# Patient Record
Sex: FEMALE | Race: OTHER | HISPANIC OR LATINO | Employment: FULL TIME | URBAN - METROPOLITAN AREA
[De-identification: names, ages, dates, MRNs, and addresses within clinical notes are randomized per-mention and may not be internally consistent; named-entity substitution may affect disease eponyms.]

---

## 2019-09-18 ENCOUNTER — OFFICE VISIT (OUTPATIENT)
Dept: FAMILY MEDICINE CLINIC | Facility: CLINIC | Age: 29
End: 2019-09-18

## 2019-09-18 VITALS
DIASTOLIC BLOOD PRESSURE: 80 MMHG | WEIGHT: 193 LBS | HEART RATE: 87 BPM | HEIGHT: 65 IN | BODY MASS INDEX: 32.15 KG/M2 | SYSTOLIC BLOOD PRESSURE: 120 MMHG | TEMPERATURE: 98 F | OXYGEN SATURATION: 93 %

## 2019-09-18 DIAGNOSIS — E66.9 OBESITY (BMI 30.0-34.9): ICD-10-CM

## 2019-09-18 DIAGNOSIS — Z00.01 ENCOUNTER FOR WELL ADULT EXAM WITH ABNORMAL FINDINGS: ICD-10-CM

## 2019-09-18 DIAGNOSIS — J02.9 SORE THROAT: ICD-10-CM

## 2019-09-18 DIAGNOSIS — Z23 NEEDS FLU SHOT: ICD-10-CM

## 2019-09-18 DIAGNOSIS — J06.9 VIRAL UPPER RESPIRATORY TRACT INFECTION: Primary | ICD-10-CM

## 2019-09-18 DIAGNOSIS — N92.6 MISSED MENSES: ICD-10-CM

## 2019-09-18 DIAGNOSIS — R73.9 HYPERGLYCEMIA: ICD-10-CM

## 2019-09-18 DIAGNOSIS — Z13.220 SCREENING FOR CHOLESTEROL LEVEL: ICD-10-CM

## 2019-09-18 LAB — S PYO AG THROAT QL: NEGATIVE

## 2019-09-18 PROCEDURE — 87880 STREP A ASSAY W/OPTIC: CPT | Performed by: NURSE PRACTITIONER

## 2019-09-18 PROCEDURE — 99213 OFFICE O/P EST LOW 20 MIN: CPT | Performed by: NURSE PRACTITIONER

## 2019-09-18 RX ORDER — ETONOGESTREL AND ETHINYL ESTRADIOL 11.7; 2.7 MG/1; MG/1
INSERT, EXTENDED RELEASE VAGINAL
COMMUNITY
Start: 2019-05-09

## 2019-09-18 RX ORDER — TOPIRAMATE 25 MG/1
TABLET ORAL
COMMUNITY
Start: 2018-09-07

## 2019-09-18 RX ORDER — PHENTERMINE HYDROCHLORIDE 37.5 MG/1
37.5 CAPSULE ORAL
COMMUNITY

## 2019-09-18 NOTE — LETTER
September 18, 2019     Patient: Rickey Ni   YOB: 1990   Date of Visit: 9/18/2019       To Whom it May Concern:    Rickey Ni is under my professional care  She was seen in my office on 9/18/2019  She may return to work on 09/20/19  If you have any questions or concerns, please don't hesitate to call           Sincerely,          DONATO Marie

## 2019-09-18 NOTE — PROGRESS NOTES
Assessment/Plan:    Viral upper respiratory tract infection  I recommended warm moist air, hydration, warm facial packs, turning on hot shower and inhaling steam to help ease with breathing  Avoid extremely cool and dry air  Nasal saline may provide temporary relief of congestion by removing nasal crusts and dried secretions  Drinking 8 or more 8-oz glasses of water, juice, or non-caffeinated beverages daily may help to thin mucous secretions and replace fluid losses  Sipping hot water or warm drinks may be more soothing to the nasal passages than ice cold drinks  Should follow up if their symptoms do not improve, worsen within 72 hours, or recur  Missed menses  -Ordering HCG quantitative  Will follow up with patient  Sore throat  -POCT strep negative  I am recommending supportive care  Increasing in fluid intake  Other supportive measures are warm salt water gargles, hard candy, frozen desserts, and humidifier  BMI Counseling: Body mass index is 32 12 kg/m²  The BMI is above normal  Nutrition recommendations include reducing portion sizes, 3-5 servings of fruits/vegetables daily, decreasing soda and/or juice intake and increasing intake of lean protein  Exercise recommendations include moderate aerobic physical activity for 150 minutes/week  Diagnoses and all orders for this visit:    Viral upper respiratory tract infection    Sore throat  -     POCT rapid strepA    Missed menses  -     hCG, quantitative; Future    Obesity (BMI 30 0-34 9)  -     TSH, 3rd generation with Free T4 reflex; Future    Screening for cholesterol level  -     Lipid Panel with Direct LDL reflex; Future    Hyperglycemia  -     Comprehensive metabolic panel;  Future    Encounter for well adult exam with abnormal findings  -     CBC and differential; Future    Needs flu shot  -     influenza vaccine, [id number], quadrivalent, 0 5 mL, preservative-free, for adult and pediatric patients 6 mos+ (AFLURIA, [place] [address], FLULAVAL, FLUZONE)    Other orders  -     Cholecalciferol 09400 units capsule  -     etonogestrel-ethinyl estradiol (NUVARING) 0 12-0 015 MG/24HR vaginal ring  -     phentermine 37 5 MG capsule; Take 37 5 mg by mouth  -     topiramate (TOPAMAX) 25 mg tablet          Subjective:      Patient ID: Michelle Delgado is a 34 y o  female  34year old female patient here to re-establish care  PMHx: no significant past medical history  Patient feeling sick and under the weather  She also verbalized that she got her period on 8/19/19 and it only last 2 days and it was a brownish discharge  Currently sexually active with no birth control  Sore Throat    This is a new problem  The current episode started in the past 7 days (3 days)  The problem has been gradually worsening  Neither side of throat is experiencing more pain than the other  The maximum temperature recorded prior to her arrival was 100 4 - 100 9 F  The fever has been present for 1 to 2 days  The pain is at a severity of 5/10  The pain is moderate  Associated symptoms include congestion, coughing, headaches, swollen glands and trouble swallowing  Pertinent negatives include no ear discharge, ear pain, plugged ear sensation or neck pain  She has had no exposure to strep or mono  She has tried acetaminophen for the symptoms  The treatment provided moderate relief  The following portions of the patient's history were reviewed and updated as appropriate: allergies, current medications, past family history, past medical history, past social history, past surgical history and problem list     Review of Systems   Constitutional: Negative  Negative for appetite change, fatigue and fever  HENT: Positive for congestion, sore throat and trouble swallowing  Negative for ear discharge, ear pain, postnasal drip, sinus pressure and sinus pain  Eyes: Negative  Respiratory: Positive for cough  Negative for chest tightness and wheezing  Cardiovascular: Negative    Negative for chest pain and palpitations  Gastrointestinal: Negative  Negative for abdominal distention  Endocrine: Negative  Genitourinary: Positive for menstrual problem  Musculoskeletal: Negative  Negative for arthralgias and neck pain  Skin: Negative  Allergic/Immunologic: Negative  Neurological: Positive for headaches  Negative for dizziness  Hematological: Negative  Negative for adenopathy  Does not bruise/bleed easily  Psychiatric/Behavioral: Negative  Objective:      /80 (BP Location: Left arm, Patient Position: Sitting, Cuff Size: Adult)   Pulse 87   Temp 98 °F (36 7 °C) (Temporal)   Ht 5' 5" (1 651 m)   Wt 87 5 kg (193 lb)   LMP 08/20/2019 (Exact Date)   SpO2 93%   BMI 32 12 kg/m²          Physical Exam   Constitutional: She is oriented to person, place, and time  She appears well-developed and well-nourished  HENT:   Head: Normocephalic and atraumatic  Right Ear: Hearing, tympanic membrane, external ear and ear canal normal    Left Ear: Hearing, tympanic membrane, external ear and ear canal normal    Nose: Rhinorrhea present  Mouth/Throat: Uvula is midline and mucous membranes are normal  Posterior oropharyngeal erythema present  Tonsils are 1+ on the right  Tonsils are 1+ on the left  Eyes: Pupils are equal, round, and reactive to light  EOM are normal    Neck: Normal range of motion  Neck supple  Cardiovascular: Normal rate, regular rhythm, normal heart sounds and intact distal pulses  Pulmonary/Chest: Effort normal and breath sounds normal  No respiratory distress  She has no wheezes  She has no rales  Abdominal: Soft  Bowel sounds are normal    Musculoskeletal: Normal range of motion  Lymphadenopathy:     She has no cervical adenopathy  Neurological: She is alert and oriented to person, place, and time  She has normal reflexes  Skin: Skin is warm and dry  Capillary refill takes less than 2 seconds     Psychiatric: She has a normal mood and affect   Her behavior is normal

## 2019-09-18 NOTE — ASSESSMENT & PLAN NOTE
-POCT strep negative  I am recommending supportive care  Increasing in fluid intake  Other supportive measures are warm salt water gargles, hard candy, frozen desserts, and humidifier

## 2019-12-17 ENCOUNTER — TELEPHONE (OUTPATIENT)
Dept: FAMILY MEDICINE CLINIC | Facility: CLINIC | Age: 29
End: 2019-12-17

## 2019-12-17 DIAGNOSIS — Z11.1 SCREENING FOR TUBERCULOSIS: Primary | ICD-10-CM

## 2019-12-17 NOTE — TELEPHONE ENCOUNTER
Pt called stated she needs a quant gold for work would like order for [facility] has no insurance

## 2021-01-06 ENCOUNTER — TELEPHONE (OUTPATIENT)
Dept: FAMILY MEDICINE CLINIC | Facility: CLINIC | Age: 31
End: 2021-01-06

## 2023-04-19 PROBLEM — J02.9 SORE THROAT: Status: RESOLVED | Noted: 2019-09-18 | Resolved: 2023-04-19

## 2023-04-19 PROBLEM — R53.83 OTHER FATIGUE: Status: ACTIVE | Noted: 2019-06-28

## 2023-04-19 PROBLEM — R55 SYNCOPE: Status: ACTIVE | Noted: 2023-04-19

## 2023-04-19 PROBLEM — F43.23 ADJUSTMENT DISORDER WITH MIXED ANXIETY AND DEPRESSED MOOD: Status: ACTIVE | Noted: 2023-04-19

## 2023-04-19 PROBLEM — R63.5 WEIGHT GAIN: Status: ACTIVE | Noted: 2023-04-19

## 2023-04-19 PROBLEM — J06.9 VIRAL UPPER RESPIRATORY TRACT INFECTION: Status: RESOLVED | Noted: 2019-09-18 | Resolved: 2023-04-19

## 2023-04-19 PROBLEM — G43.C0 PERIODIC HEADACHE SYNDROME, NOT INTRACTABLE: Status: ACTIVE | Noted: 2023-04-19

## 2023-05-05 ENCOUNTER — PATIENT OUTREACH (OUTPATIENT)
Dept: FAMILY MEDICINE CLINIC | Facility: CLINIC | Age: 33
End: 2023-05-05

## 2023-05-05 NOTE — PROGRESS NOTES
FRANCA KELLY received referral from provider, James Andujar PA-C regarding patient having adjustment disorder, financial issues, difficulty with her boss at work and not having health insurance  FRANCA KELLY did place call to the patient, Hilda Ramachandran and left message  FRANCA KELLY will await return call to provide resources and psychosocial support as needed

## 2023-05-10 ENCOUNTER — PATIENT OUTREACH (OUTPATIENT)
Dept: FAMILY MEDICINE CLINIC | Facility: CLINIC | Age: 33
End: 2023-05-10

## 2023-05-10 DIAGNOSIS — Z59.9 FINANCIAL DIFFICULTIES: Primary | ICD-10-CM

## 2023-05-10 SDOH — ECONOMIC STABILITY - INCOME SECURITY: PROBLEM RELATED TO HOUSING AND ECONOMIC CIRCUMSTANCES, UNSPECIFIED: Z59.9
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
## 2023-05-11 ENCOUNTER — PATIENT OUTREACH (OUTPATIENT)
Dept: FAMILY MEDICINE CLINIC | Facility: CLINIC | Age: 33
End: 2023-05-11

## 2023-05-11 NOTE — PROGRESS NOTES
Patient was referred on Findhelp to [facility], [employer], [address] [place] [address] for health     FRANCA KELLY emailed patient information on biweekly food distrubution through [facility] and [employer]    FRANCA KELLY will continue to remain available for psychosocial support as needed  Propranolol Counseling:  I discussed with the patient the risks of propranolol including but not limited to low heart rate, low blood pressure, low blood sugar, restlessness and increased cold sensitivity. They should call the office if they experience any of these side effects.

## 2023-05-15 ENCOUNTER — PATIENT OUTREACH (OUTPATIENT)
Dept: FAMILY MEDICINE CLINIC | Facility: CLINIC | Age: 33
End: 2023-05-15

## 2023-05-22 ENCOUNTER — PATIENT OUTREACH (OUTPATIENT)
Dept: FAMILY MEDICINE CLINIC | Facility: CLINIC | Age: 33
End: 2023-05-22

## 2023-05-22 NOTE — PROGRESS NOTES
FRANCA KELLY placed follow up call to the patient, Agnes and left message  FRANCA KELLY will continue to remain available for psychosocial support as needed

## 2023-05-30 ENCOUNTER — PATIENT OUTREACH (OUTPATIENT)
Dept: FAMILY MEDICINE CLINIC | Facility: CLINIC | Age: 33
End: 2023-05-30

## 2023-06-05 ENCOUNTER — PATIENT OUTREACH (OUTPATIENT)
Dept: FAMILY MEDICINE CLINIC | Facility: CLINIC | Age: 33
End: 2023-06-05

## 2023-06-05 NOTE — LETTER
06/05/23    Dear George Cole,    I am a Community Health Worker with [facility] AT [address] PRIMARY CARE [address] [id number] [phone]  I have made several attempts to call you by phone  It is important that you contact me back at [phone] so that I can assist with your care needs       Sincerely,         Tuan Cotter  [facility]

## 2023-06-13 ENCOUNTER — PATIENT OUTREACH (OUTPATIENT)
Dept: FAMILY MEDICINE CLINIC | Facility: CLINIC | Age: 33
End: 2023-06-13

## 2023-06-23 ENCOUNTER — PATIENT OUTREACH (OUTPATIENT)
Dept: FAMILY MEDICINE CLINIC | Facility: CLINIC | Age: 33
End: 2023-06-23

## 2023-06-23 NOTE — PROGRESS NOTES
FRANCA KELLY placed additional follow up call to the patient, Rashmi Evans and was unable to leave a message as voice box was full  FRANCA KELLY will continue to remain available for psychosocial support as needed

## 2023-07-13 ENCOUNTER — PATIENT OUTREACH (OUTPATIENT)
Dept: FAMILY MEDICINE CLINIC | Facility: CLINIC | Age: 33
End: 2023-07-13

## 2023-07-13 NOTE — PROGRESS NOTES
has made several attempts to outreach pt and have been unable to reach pt. OC did mail out an MA application so that pt can complete and submit to the [facility] assistance office. OC will be closing case due to unable to reach. No further outreach.

## 2023-07-14 ENCOUNTER — PATIENT OUTREACH (OUTPATIENT)
Dept: FAMILY MEDICINE CLINIC | Facility: CLINIC | Age: 33
End: 2023-07-14

## 2023-07-14 NOTE — PROGRESS NOTES
FRANCA KELLY received in-basket from [facility] indicating that [facility] referral was closed due to lack of response from patient. [facility] had sent patient SNAP application via mail. Patient never returned any calls to [facility]. FRANCA KELLY had sent food bank resources, food delivery resources, mental health locations, local housing support services, and information on [employer]. FRANCA CM will close referral due to lost communication. FRANCA CM will remain available for future psychosocial support as needed.

## 2023-09-11 DIAGNOSIS — F43.23 ADJUSTMENT DISORDER WITH MIXED ANXIETY AND DEPRESSED MOOD: ICD-10-CM

## 2023-09-11 DIAGNOSIS — R03.0 ELEVATED BLOOD PRESSURE READING WITHOUT DIAGNOSIS OF HYPERTENSION: ICD-10-CM

## 2023-09-11 RX ORDER — PROPRANOLOL HCL 60 MG
60 CAPSULE, EXTENDED RELEASE 24HR ORAL DAILY
Qty: 90 CAPSULE | Refills: 0 | Status: SHIPPED | OUTPATIENT
Start: 2023-09-11

## 2023-10-05 ENCOUNTER — TELEPHONE (OUTPATIENT)
Dept: ADMINISTRATIVE | Facility: OTHER | Age: 33
End: 2023-10-05

## 2023-10-05 NOTE — TELEPHONE ENCOUNTER
Upon review of the In Basket request we were able to locate, review, and update the patient chart as requested for Pap Smear (HPV) aka Cervical Cancer Screening. Any additional questions or concerns should be emailed to the Practice Liaisons via the appropriate education email address, please do not reply via In Basket.     Thank you  Vivi Harrington

## 2023-10-05 NOTE — TELEPHONE ENCOUNTER
----- Message from Roger Almanza RN sent at 10/4/2023  3:40 PM EDT -----  Regarding: pap  10/04/23 3:41 PM    Hello, our patient Izabella Dhillon has had Pap Smear (HPV) aka Cervical Cancer Screening completed/performed. Please assist in updating the patient chart by pulling the Care Everywhere (CE) document. The date of service is 9/2023.      Thank you,  Roger Almanza RN  [facility]

## 2023-12-07 DIAGNOSIS — R03.0 ELEVATED BLOOD PRESSURE READING WITHOUT DIAGNOSIS OF HYPERTENSION: ICD-10-CM

## 2023-12-07 RX ORDER — PROPRANOLOL HCL 60 MG
CAPSULE, EXTENDED RELEASE 24HR ORAL
Qty: 90 CAPSULE | Refills: 0 | Status: SHIPPED | OUTPATIENT
Start: 2023-12-07

## 2024-07-30 ENCOUNTER — TELEPHONE (OUTPATIENT)
Dept: FAMILY MEDICINE CLINIC | Facility: CLINIC | Age: 34
End: 2024-07-30

## 2024-08-19 ENCOUNTER — TELEPHONE (OUTPATIENT)
Dept: FAMILY MEDICINE CLINIC | Facility: CLINIC | Age: 34
End: 2024-08-19

## 2024-12-06 ENCOUNTER — TELEPHONE (OUTPATIENT)
Dept: FAMILY MEDICINE CLINIC | Facility: CLINIC | Age: 34
End: 2024-12-06

## 2025-01-28 LAB
EXTERNAL HIV SCREEN: NORMAL
HCV AB SER-ACNC: NEGATIVE

## 2025-01-29 ENCOUNTER — TELEPHONE (OUTPATIENT)
Dept: ADMINISTRATIVE | Facility: OTHER | Age: 35
End: 2025-01-29

## 2025-01-29 NOTE — TELEPHONE ENCOUNTER
----- Message from Tosha DURAN sent at 1/29/2025  6:27 AM EST -----  Regarding: hep c, hiv  01/29/25 6:27 AM    Hello, our patient Agnes Duffy has had Hepatitis C and HIV completed/performed. Please assist in updating the patient chart by pulling the Care Everywhere (CE) document. The date of service is 2025.     Thank you,  Tosha Arshad RN  [facility]  PRIMARY CARE [facility]

## 2025-01-29 NOTE — TELEPHONE ENCOUNTER
Upon review of the In Basket request we were able to locate, review, and update the patient chart as requested for Hepatitis C  and HIV.    Any additional questions or concerns should be emailed to the Practice Liaisons via the appropriate education email address, please do not reply via In Basket.    Thank you  Katie Peterson   [facility]